# Patient Record
Sex: FEMALE | Race: WHITE
[De-identification: names, ages, dates, MRNs, and addresses within clinical notes are randomized per-mention and may not be internally consistent; named-entity substitution may affect disease eponyms.]

---

## 2018-02-03 ENCOUNTER — HOSPITAL ENCOUNTER (EMERGENCY)
Dept: HOSPITAL 17 - NEPD | Age: 28
LOS: 1 days | Discharge: HOME | End: 2018-02-04
Payer: MEDICAID

## 2018-02-03 VITALS
RESPIRATION RATE: 16 BRPM | TEMPERATURE: 98.3 F | DIASTOLIC BLOOD PRESSURE: 98 MMHG | OXYGEN SATURATION: 98 % | HEART RATE: 95 BPM | SYSTOLIC BLOOD PRESSURE: 139 MMHG

## 2018-02-03 VITALS
RESPIRATION RATE: 16 BRPM | TEMPERATURE: 98.6 F | SYSTOLIC BLOOD PRESSURE: 131 MMHG | HEART RATE: 80 BPM | OXYGEN SATURATION: 98 % | DIASTOLIC BLOOD PRESSURE: 82 MMHG

## 2018-02-03 VITALS — WEIGHT: 231.49 LBS | HEIGHT: 69 IN | BODY MASS INDEX: 34.29 KG/M2

## 2018-02-03 DIAGNOSIS — Z04.6: Primary | ICD-10-CM

## 2018-02-03 DIAGNOSIS — F17.200: ICD-10-CM

## 2018-02-03 DIAGNOSIS — F12.90: ICD-10-CM

## 2018-02-03 DIAGNOSIS — F39: ICD-10-CM

## 2018-02-03 LAB
ALBUMIN SERPL-MCNC: 3.8 GM/DL (ref 3.4–5)
ALP SERPL-CCNC: 93 U/L (ref 45–117)
ALT SERPL-CCNC: 30 U/L (ref 10–53)
APAP SERPL-MCNC: (no result) MCG/ML (ref 10–30)
AST SERPL-CCNC: 15 U/L (ref 15–37)
BASOPHILS # BLD AUTO: 0 TH/MM3 (ref 0–0.2)
BASOPHILS NFR BLD: 0.5 % (ref 0–2)
BILIRUB SERPL-MCNC: 0.3 MG/DL (ref 0.2–1)
BUN SERPL-MCNC: 15 MG/DL (ref 7–18)
CALCIUM SERPL-MCNC: 8.8 MG/DL (ref 8.5–10.1)
CHLORIDE SERPL-SCNC: 107 MEQ/L (ref 98–107)
CREAT SERPL-MCNC: 0.77 MG/DL (ref 0.5–1)
EOSINOPHIL # BLD: 0.2 TH/MM3 (ref 0–0.4)
EOSINOPHIL NFR BLD: 2.4 % (ref 0–4)
ERYTHROCYTE [DISTWIDTH] IN BLOOD BY AUTOMATED COUNT: 15 % (ref 11.6–17.2)
GFR SERPLBLD BASED ON 1.73 SQ M-ARVRAT: 89 ML/MIN (ref 89–?)
GLUCOSE SERPL-MCNC: 98 MG/DL (ref 74–106)
HCO3 BLD-SCNC: 26.5 MEQ/L (ref 21–32)
HCT VFR BLD CALC: 38.2 % (ref 35–46)
HGB BLD-MCNC: 12.8 GM/DL (ref 11.6–15.3)
LYMPHOCYTES # BLD AUTO: 1.2 TH/MM3 (ref 1–4.8)
LYMPHOCYTES NFR BLD AUTO: 15 % (ref 9–44)
MCH RBC QN AUTO: 25.6 PG (ref 27–34)
MCHC RBC AUTO-ENTMCNC: 33.5 % (ref 32–36)
MCV RBC AUTO: 76.2 FL (ref 80–100)
MONOCYTE #: 0.5 TH/MM3 (ref 0–0.9)
MONOCYTES NFR BLD: 6.5 % (ref 0–8)
NEUTROPHILS # BLD AUTO: 6 TH/MM3 (ref 1.8–7.7)
NEUTROPHILS NFR BLD AUTO: 75.6 % (ref 16–70)
PLATELET # BLD: 261 TH/MM3 (ref 150–450)
PMV BLD AUTO: 8.1 FL (ref 7–11)
PROT SERPL-MCNC: 7.4 GM/DL (ref 6.4–8.2)
RBC # BLD AUTO: 5.01 MIL/MM3 (ref 4–5.3)
SODIUM SERPL-SCNC: 140 MEQ/L (ref 136–145)
WBC # BLD AUTO: 7.9 TH/MM3 (ref 4–11)

## 2018-02-03 PROCEDURE — 99284 EMERGENCY DEPT VISIT MOD MDM: CPT

## 2018-02-03 PROCEDURE — 85025 COMPLETE CBC W/AUTO DIFF WBC: CPT

## 2018-02-03 PROCEDURE — 84703 CHORIONIC GONADOTROPIN ASSAY: CPT

## 2018-02-03 PROCEDURE — 84443 ASSAY THYROID STIM HORMONE: CPT

## 2018-02-03 PROCEDURE — 80307 DRUG TEST PRSMV CHEM ANLYZR: CPT

## 2018-02-03 PROCEDURE — 80053 COMPREHEN METABOLIC PANEL: CPT

## 2018-02-03 NOTE — PD
HPI


Chief Complaint:  Psychiatric Symptoms


Time Seen by Provider:  21:02


Travel History


International Travel<30 days:  No


Contact w/Intl Traveler<30days:  No


Traveled to known affect area:  No





History of Present Illness


HPI


27-year-old white female presents to emergency department under [place] act by 

PD.  The patient had made suicide suggestive statements.  She had gotten into 

an argument with her father and mother this evening.  She had broken up with 

the father of her 3 children and moved into her parents house a few months ago.

  They've had relationship issues.  She states that they started arguing today.

  She threatened to leave with her children.  The patient then made a statement 

such as it would be better he were not to be here.  The patient denies any true 

suicidal ideation.  No homicidal ideation.  No toxic ingestions.  No recent 

medical complaints.  She does drink alcohol on occasion.  She does smoke 

marijuana one on occasion.





PFSH


Past Medical History


*** Narrative Medical


Gestational diabetes


Tetanus Vaccination:  < 5 Years


Pregnant?:  Not Pregnant


Tubal Ligation:  Yes





Past Surgical History


*** Narrative Surgical


 3, tubal ligation


 Section:  Yes ( X 3)





Social History


Alcohol Use:  Yes


Tobacco Use:  Yes (/ PPD)


Substance Use:  Yes





Allergies-Medications


(Allergen,Severity, Reaction):  


Coded Allergies:  


     No Known Allergies (Unverified  Adverse Reaction, Unknown, 2/3/18)


Reported Meds & Prescriptions





Reported Meds & Active Scripts


Active


No Active Prescriptions or Reported Medications    








Review of Systems


General / Constitutional:  No: Fever


Eyes:  No: Visual changes


HENT:  No: Headaches


Cardiovascular:  No: Chest Pain or Discomfort


Respiratory:  No: Shortness of Breath


Gastrointestinal:  No: Abdominal Pain


Genitourinary:  No: Dysuria


Musculoskeletal:  No: Pain


Skin:  No Rash


Neurologic:  No: Weakness


Psychiatric:  Positive: Mood Disorder, Substance Abuse, No: Anxiety, Depression

, Suicidal Ideations, Disorder of Thought, Homicidal Ideation


Endocrine:  No: Polydipsia


Hematologic/Lymphatic:  No: Easy Bruising





Physical Exam


Narrative


GENERAL: Well-nourished, well-developed patient.


SKIN: Warm and dry.


HEAD: Normocephalic and atraumatic.


EYES: No scleral icterus. No injection or drainage. 


ENT: No nasal drainage noted. Mucous membranes pink. Airway patent.


NECK: Supple, trachea midline.  Moves head freely without obvious discomfort.


CARDIOVASCULAR: Regular rate and rhythm without murmurs, gallops, or rubs. 


RESPIRATORY: Breath sounds equal bilaterally. No accessory muscle use.


GASTROINTESTINAL: Abdomen soft, non-tender, nondistended. 


EXTREMITIES: No cyanosis or edema.


BACK: Nontender without obvious deformity. No CVA tenderness.


NEURO: Patient is alert and oriented. no sensorimotor deficits.  Nonfocal.  

Normal speech.


PSYCH: No delusions.  No auditory or visual hallucinations.





Data


Data


Last Documented VS





Vital Signs








  Date Time  Temp Pulse Resp B/P (MAP) Pulse Ox O2 Delivery O2 Flow Rate FiO2


 


2/3/18 21:07 98.3 95 16 139/98 (112) 98   








Orders





 Orders


Complete Blood Count With Diff (2/3/18 21:14)


Comprehensive Metabolic Panel (2/3/18 21:14)


Thyroid Stimulating Hormone (2/3/18 21:14)


Ed Urine Pregnancytest Poc (2/3/18 21:14)


Psych Screen (2/3/18 21:14)


Drug Screen, Random Urine (2/3/18 21:14)


Alcohol (Ethanol) (2/3/18 21:14)


Salicylates (Aspirin) (2/3/18 21:14)


Tylenol (Acetaminophen) (2/3/18 21:14)





Labs





Laboratory Tests








Test


  2/3/18


21:15 2/3/18


21:30


 


White Blood Count 7.9 TH/MM3  


 


Red Blood Count 5.01 MIL/MM3  


 


Hemoglobin 12.8 GM/DL  


 


Hematocrit 38.2 %  


 


Mean Corpuscular Volume 76.2 FL  


 


Mean Corpuscular Hemoglobin 25.6 PG  


 


Mean Corpuscular Hemoglobin


Concent 33.5 % 


  


 


 


Red Cell Distribution Width 15.0 %  


 


Platelet Count 261 TH/MM3  


 


Mean Platelet Volume 8.1 FL  


 


Neutrophils (%) (Auto) 75.6 %  


 


Lymphocytes (%) (Auto) 15.0 %  


 


Monocytes (%) (Auto) 6.5 %  


 


Eosinophils (%) (Auto) 2.4 %  


 


Basophils (%) (Auto) 0.5 %  


 


Neutrophils # (Auto) 6.0 TH/MM3  


 


Lymphocytes # (Auto) 1.2 TH/MM3  


 


Monocytes # (Auto) 0.5 TH/MM3  


 


Eosinophils # (Auto) 0.2 TH/MM3  


 


Basophils # (Auto) 0.0 TH/MM3  


 


CBC Comment DIFF FINAL  


 


Differential Comment   


 


Blood Urea Nitrogen 15 MG/DL  


 


Creatinine 0.77 MG/DL  


 


Random Glucose 98 MG/DL  


 


Total Protein 7.4 GM/DL  


 


Albumin 3.8 GM/DL  


 


Calcium Level 8.8 MG/DL  


 


Alkaline Phosphatase 93 U/L  


 


Aspartate Amino Transf


(AST/SGOT) 15 U/L 


  


 


 


Alanine Aminotransferase


(ALT/SGPT) 30 U/L 


  


 


 


Total Bilirubin 0.3 MG/DL  


 


Sodium Level 140 MEQ/L  


 


Potassium Level 3.7 MEQ/L  


 


Chloride Level 107 MEQ/L  


 


Carbon Dioxide Level 26.5 MEQ/L  


 


Anion Gap 7 MEQ/L  


 


Estimat Glomerular Filtration


Rate 89 ML/MIN 


  


 


 


Thyroid Stimulating Hormone


3rd Gen 0.549 uIU/ML 


  


 


 


Ethyl Alcohol Level


  LESS THAN 3


MG/DL 


 


 


Urine Opiates Screen  NEG 


 


Urine Barbiturates Screen  NEG 


 


Urine Amphetamines Screen  NEG 


 


Urine Benzodiazepines Screen  NEG 


 


Urine Cocaine Screen  NEG 


 


Urine Cannabinoids Screen  POS 











MDM


Medical Decision Making


Medical Screen Exam Complete:  Yes


Emergency Medical Condition:  Yes


Medical Record Reviewed:  Yes


Interpretation(s)





Laboratory Tests








Test


  2/3/18


21:15 2/3/18


21:30


 


White Blood Count 7.9 TH/MM3  


 


Red Blood Count 5.01 MIL/MM3  


 


Hemoglobin 12.8 GM/DL  


 


Hematocrit 38.2 %  


 


Mean Corpuscular Volume 76.2 FL  


 


Mean Corpuscular Hemoglobin 25.6 PG  


 


Mean Corpuscular Hemoglobin


Concent 33.5 % 


  


 


 


Red Cell Distribution Width 15.0 %  


 


Platelet Count 261 TH/MM3  


 


Mean Platelet Volume 8.1 FL  


 


Neutrophils (%) (Auto) 75.6 %  


 


Lymphocytes (%) (Auto) 15.0 %  


 


Monocytes (%) (Auto) 6.5 %  


 


Eosinophils (%) (Auto) 2.4 %  


 


Basophils (%) (Auto) 0.5 %  


 


Neutrophils # (Auto) 6.0 TH/MM3  


 


Lymphocytes # (Auto) 1.2 TH/MM3  


 


Monocytes # (Auto) 0.5 TH/MM3  


 


Eosinophils # (Auto) 0.2 TH/MM3  


 


Basophils # (Auto) 0.0 TH/MM3  


 


CBC Comment DIFF FINAL  


 


Differential Comment   


 


Blood Urea Nitrogen 15 MG/DL  


 


Creatinine 0.77 MG/DL  


 


Random Glucose 98 MG/DL  


 


Total Protein 7.4 GM/DL  


 


Albumin 3.8 GM/DL  


 


Calcium Level 8.8 MG/DL  


 


Alkaline Phosphatase 93 U/L  


 


Aspartate Amino Transf


(AST/SGOT) 15 U/L 


  


 


 


Alanine Aminotransferase


(ALT/SGPT) 30 U/L 


  


 


 


Total Bilirubin 0.3 MG/DL  


 


Sodium Level 140 MEQ/L  


 


Potassium Level 3.7 MEQ/L  


 


Chloride Level 107 MEQ/L  


 


Carbon Dioxide Level 26.5 MEQ/L  


 


Anion Gap 7 MEQ/L  


 


Estimat Glomerular Filtration


Rate 89 ML/MIN 


  


 


 


Thyroid Stimulating Hormone


3rd Gen 0.549 uIU/ML 


  


 


 


Ethyl Alcohol Level


  LESS THAN 3


MG/DL 


 


 


Urine Opiates Screen  NEG 


 


Urine Barbiturates Screen  NEG 


 


Urine Amphetamines Screen  NEG 


 


Urine Benzodiazepines Screen  NEG 


 


Urine Cocaine Screen  NEG 


 


Urine Cannabinoids Screen  POS 








Differential Diagnosis


MDM: High


Differential diagnoses: Schizophrenia, schizoaffective disorder, bipolar, 

anxiety, depression, adjustment reaction, mood disorder NOS, ODD, depressive 

disorder NOS, dementia, dementia with agitation, psychosis NOS, substance 

induced mood disorder, DMDD, Asperger syndrome, infection,electrolyte 

abnormality, malingering.


Narrative Course


Mental health screening discussed with the patient.  Psychiatric screen ordered.





The patient is been medically cleared.





This is medical clearance for psychiatric admission





Diagnosis





 Primary Impression:  


 Medical clearance for psychiatric admission


Scripts


No Active Prescriptions or Reported Meds


Condition:  Stable











Gildardo Mccormack Feb 3, 2018 21:40

## 2018-02-04 VITALS
HEART RATE: 71 BPM | DIASTOLIC BLOOD PRESSURE: 67 MMHG | SYSTOLIC BLOOD PRESSURE: 109 MMHG | RESPIRATION RATE: 18 BRPM | OXYGEN SATURATION: 97 %

## 2018-02-04 VITALS
HEART RATE: 75 BPM | TEMPERATURE: 98.6 F | SYSTOLIC BLOOD PRESSURE: 120 MMHG | OXYGEN SATURATION: 96 % | DIASTOLIC BLOOD PRESSURE: 69 MMHG | RESPIRATION RATE: 17 BRPM

## 2018-02-04 NOTE — PD
Data


Data


Last Documented VS





Vital Signs








  Date Time  Temp Pulse Resp B/P (MAP) Pulse Ox O2 Delivery O2 Flow Rate FiO2


 


2/4/18 11:11  71 18 109/67 (81) 97 Room Air  


 


2/4/18 07:06 98.6       








Orders





 Orders


Complete Blood Count With Diff (2/3/18 21:14)


Comprehensive Metabolic Panel (2/3/18 21:14)


Thyroid Stimulating Hormone (2/3/18 21:14)


Ed Urine Pregnancytest Poc (2/3/18 21:14)


Psych Screen (2/3/18 21:14)


Drug Screen, Random Urine (2/3/18 21:14)


Alcohol (Ethanol) (2/3/18 21:14)


Salicylates (Aspirin) (2/3/18 21:14)


Tylenol (Acetaminophen) (2/3/18 21:14)


Diet Regular Basic (2/4/18 Breakfast)


Diet Regular Basic (2/4/18 Lunch)


Diet Regular Basic (2/4/18 Dinner)





Labs





Laboratory Tests








Test


  2/3/18


21:15 2/3/18


21:30


 


White Blood Count 7.9 TH/MM3  


 


Red Blood Count 5.01 MIL/MM3  


 


Hemoglobin 12.8 GM/DL  


 


Hematocrit 38.2 %  


 


Mean Corpuscular Volume 76.2 FL  


 


Mean Corpuscular Hemoglobin 25.6 PG  


 


Mean Corpuscular Hemoglobin


Concent 33.5 % 


  


 


 


Red Cell Distribution Width 15.0 %  


 


Platelet Count 261 TH/MM3  


 


Mean Platelet Volume 8.1 FL  


 


Neutrophils (%) (Auto) 75.6 %  


 


Lymphocytes (%) (Auto) 15.0 %  


 


Monocytes (%) (Auto) 6.5 %  


 


Eosinophils (%) (Auto) 2.4 %  


 


Basophils (%) (Auto) 0.5 %  


 


Neutrophils # (Auto) 6.0 TH/MM3  


 


Lymphocytes # (Auto) 1.2 TH/MM3  


 


Monocytes # (Auto) 0.5 TH/MM3  


 


Eosinophils # (Auto) 0.2 TH/MM3  


 


Basophils # (Auto) 0.0 TH/MM3  


 


CBC Comment DIFF FINAL  


 


Differential Comment   


 


Blood Urea Nitrogen 15 MG/DL  


 


Creatinine 0.77 MG/DL  


 


Random Glucose 98 MG/DL  


 


Total Protein 7.4 GM/DL  


 


Albumin 3.8 GM/DL  


 


Calcium Level 8.8 MG/DL  


 


Alkaline Phosphatase 93 U/L  


 


Aspartate Amino Transf


(AST/SGOT) 15 U/L 


  


 


 


Alanine Aminotransferase


(ALT/SGPT) 30 U/L 


  


 


 


Total Bilirubin 0.3 MG/DL  


 


Sodium Level 140 MEQ/L  


 


Potassium Level 3.7 MEQ/L  


 


Chloride Level 107 MEQ/L  


 


Carbon Dioxide Level 26.5 MEQ/L  


 


Anion Gap 7 MEQ/L  


 


Estimat Glomerular Filtration


Rate 89 ML/MIN 


  


 


 


Thyroid Stimulating Hormone


3rd Gen 0.549 uIU/ML 


  


 


 


Salicylates Level 3.1 MG/DL  


 


Acetaminophen Level


  LESS THAN 2.0


MCG/ML 


 


 


Ethyl Alcohol Level


  LESS THAN 3


MG/DL 


 


 


Urine Opiates Screen  NEG 


 


Urine Barbiturates Screen  NEG 


 


Urine Amphetamines Screen  NEG 


 


Urine Benzodiazepines Screen  NEG 


 


Urine Cocaine Screen  NEG 


 


Urine Cannabinoids Screen  POS 











MDM


Supervised Visit with [facility]:  Yes


Narrative Course


The history, exam, and medical decision-making in the associated midlevel 

provider note were completed with my assistance. I reviewed and agree with the 

findings presented.  I attest that I had a face-to-face encounter with the 

patient on the same day, and personally performed and documented my assessment 

and findings in the medical record. 





*My assessment and Findings: This is a 27-year-old female who has no history of 

mental illness or prior hospitalizations who presents to the emergency 

department under a [place] act stating that she made suicidal statements.  In the 

[place] acted it states that the patient denies this.  I spoke to both the 

patient and her parents.  They were all in an argument and ultimately the 

police were called.  Her parents said that she stated she did not want to live 

anymore.  The patient is under a lot of stress because she has 3 young children 

and is living in her parents house and does not have much support from the 

children's father.  She has been working nights.  Her mother says she has been 

concerned because she has been sleeping 12-13 hours per day.  The patient says 

that she is exhausted and feels somewhat overwhelmed but has no thoughts of 

hurting herself and is not suicidal.  She says she loves her children and she 

wants to take care of them and is just trying to work through a stressful time. 

She is future oriented.    My impression is the patient is not acutely 

depressed or suicidal and I think she is safe for discharge.  I do not think we 

can hold her under a [place] act as she does not meet criteria.  I lifted the 

[place] act and the patient will be discharged home.


Diagnosis





 Primary Impression:  


 Medical clearance for psychiatric admission





***Additional Instruction:  


If you have thoughts of hurting herself or others please return to the 

emergency department or call 911.


***Med/Other Pt SpecificInfo:  No Change to Meds


Scripts


No Active Prescriptions or Reported Meds


Disposition:  01 DISCHARGE HOME


Condition:  Stable











Nai Flores MD Feb 4, 2018 15:58